# Patient Record
Sex: FEMALE | Race: AMERICAN INDIAN OR ALASKA NATIVE
[De-identification: names, ages, dates, MRNs, and addresses within clinical notes are randomized per-mention and may not be internally consistent; named-entity substitution may affect disease eponyms.]

---

## 2022-01-29 ENCOUNTER — HOSPITAL ENCOUNTER (INPATIENT)
Dept: HOSPITAL 5 - TRG | Age: 32
LOS: 1 days | Discharge: HOME | End: 2022-01-30
Attending: OBSTETRICS & GYNECOLOGY | Admitting: OBSTETRICS & GYNECOLOGY
Payer: COMMERCIAL

## 2022-01-29 DIAGNOSIS — Z3A.39: ICD-10-CM

## 2022-01-29 DIAGNOSIS — Z20.822: ICD-10-CM

## 2022-01-29 LAB
HCT VFR BLD CALC: 38.7 % (ref 30.3–42.9)
HGB BLD-MCNC: 13.1 GM/DL (ref 10.1–14.3)
MCHC RBC AUTO-ENTMCNC: 34 % (ref 30–34)
MCV RBC AUTO: 95 FL (ref 79–97)
PLATELET # BLD: 222 K/MM3 (ref 140–440)
RBC # BLD AUTO: 4.08 M/MM3 (ref 3.65–5.03)

## 2022-01-29 PROCEDURE — 0KQM0ZZ REPAIR PERINEUM MUSCLE, OPEN APPROACH: ICD-10-PCS | Performed by: OBSTETRICS & GYNECOLOGY

## 2022-01-29 PROCEDURE — 86850 RBC ANTIBODY SCREEN: CPT

## 2022-01-29 PROCEDURE — U0003 INFECTIOUS AGENT DETECTION BY NUCLEIC ACID (DNA OR RNA); SEVERE ACUTE RESPIRATORY SYNDROME CORONAVIRUS 2 (SARS-COV-2) (CORONAVIRUS DISEASE [COVID-19]), AMPLIFIED PROBE TECHNIQUE, MAKING USE OF HIGH THROUGHPUT TECHNOLOGIES AS DESCRIBED BY CMS-2020-01-R: HCPCS

## 2022-01-29 PROCEDURE — 85018 HEMOGLOBIN: CPT

## 2022-01-29 PROCEDURE — 85027 COMPLETE CBC AUTOMATED: CPT

## 2022-01-29 PROCEDURE — 86592 SYPHILIS TEST NON-TREP QUAL: CPT

## 2022-01-29 PROCEDURE — 85014 HEMATOCRIT: CPT

## 2022-01-29 PROCEDURE — 86901 BLOOD TYPING SEROLOGIC RH(D): CPT

## 2022-01-29 PROCEDURE — 10907ZC DRAINAGE OF AMNIOTIC FLUID, THERAPEUTIC FROM PRODUCTS OF CONCEPTION, VIA NATURAL OR ARTIFICIAL OPENING: ICD-10-PCS | Performed by: OBSTETRICS & GYNECOLOGY

## 2022-01-29 PROCEDURE — 36415 COLL VENOUS BLD VENIPUNCTURE: CPT

## 2022-01-29 PROCEDURE — 86900 BLOOD TYPING SEROLOGIC ABO: CPT

## 2022-01-29 PROCEDURE — 3E033VJ INTRODUCTION OF OTHER HORMONE INTO PERIPHERAL VEIN, PERCUTANEOUS APPROACH: ICD-10-PCS | Performed by: OBSTETRICS & GYNECOLOGY

## 2022-01-29 RX ADMIN — FENTANYL CITRATE PRN MCG: 50 INJECTION, SOLUTION INTRAMUSCULAR; INTRAVENOUS at 07:50

## 2022-01-29 RX ADMIN — SODIUM CHLORIDE, SODIUM LACTATE, POTASSIUM CHLORIDE, AND CALCIUM CHLORIDE SCH MLS/HR: .6; .31; .03; .02 INJECTION, SOLUTION INTRAVENOUS at 07:54

## 2022-01-29 RX ADMIN — FENTANYL CITRATE PRN MCG: 50 INJECTION, SOLUTION INTRAMUSCULAR; INTRAVENOUS at 13:07

## 2022-01-29 RX ADMIN — SODIUM CHLORIDE, SODIUM LACTATE, POTASSIUM CHLORIDE, AND CALCIUM CHLORIDE SCH MLS/HR: .6; .31; .03; .02 INJECTION, SOLUTION INTRAVENOUS at 12:47

## 2022-01-29 RX ADMIN — FENTANYL CITRATE PRN MCG: 50 INJECTION, SOLUTION INTRAMUSCULAR; INTRAVENOUS at 10:49

## 2022-01-29 RX ADMIN — IBUPROFEN SCH MG: 600 TABLET, FILM COATED ORAL at 18:08

## 2022-01-29 NOTE — HISTORY AND PHYSICAL REPORT
History of Present Illness


Date of examination: 22


Date of admission: 


22


Chief complaint: 


Contractions





History of present illness: 


31 year old female  presents to L&D complaining of contractions and 

possible leaking of fluid. 


Patient received prenatal care at [facility] and 

prenatal records are available.


LMP unknown. EDC 22 (based on 20 wk. US).


Pregnancy uncomplicated. Has history of 2 previous vaginal births. 


Prenatal labs are as follows: O+, antibody screen negative, rubella immune, 

varicella immune, hepatitis B surface antigen negative, hepatitis C antibody 

nonreactive, HIV negative, RPR nonreactive, hemoglobin electrophoresis normal, 1

hour sugar test 51, gonorrhea negative, chlamydia negative, trichomonas 

negative, GBS negative.                                                         

                                                                                

                                                                                

                                                                                

                          








Past History


Past Medical History: no pertinent history


Past Surgical History: no surgical history


GYN History: denies: chlamydia, gonorrhea, hepatitis B, hepatitis C, herpes, 

HIV, syphilis, trichomonas


Family/Genetic History: none


Social history: lives with family, full code.  denies: smoking, alcohol abuse, 

prescription drug abuse, IV drug use





- Obstetrical History


Expected Date of Delivery: 22


Actual Gestation: 39 Week(s) 5 Day(s) 


: 3


Para: 2


Hx # Term Pregnancies: 2


Number of  Pregnancies: 0


Spontaneous Abortions: 0


Induced : 0


Number of Living Children: 2





Medications and Allergies


                                    Allergies











Allergy/AdvReac Type Severity Reaction Status Date / Time


 


pineapple Allergy  Itching Verified 22 04:40











                                Home Medications











 Medication  Instructions  Recorded  Confirmed  Last Taken  Type


 


Prenatal Vitamin 1 tab PO QDAY 22 20:00 History














Review of Systems


All systems: negative (contractions, possible LOF)





- Vital Signs


Vital signs: 


                                   Vital Signs











Temp Pulse Resp BP


 


 98 F   89   17   103/63 


 


 22 04:30  22 04:30  22 04:30  22 04:30








                                        











Temp Pulse Resp BP Pulse Ox


 


 98 F   97 H  17   103/63   100 


 


 22 04:30  22 04:47  22 04:30  22 04:36  22 04:47














- Physical Exam


Abdomen: Positive: normal appearance, soft.  Negative: distention, tenderness, 

guarding, rigidity


Genitourinary (Female): Positive: normal external genitalia, normal perenium.  

Negative: perineal/vulvar lesions


Vagina: Positive: normal moisture


Uterus: Positive: enlarged.  Negative: tender


Anus/Rectum: Positive: normal perianal skin


Extremities: Negative: tenderness





- Obstetrical


FHR: category 2


Uterine Contraction Monitor Mode: External


Cervical Dilatation: 4


Cervical Effacement Percentage: 50


Fetal station: -3


Uterine Contraction Pattern: Regular


Uterine Contraction Intensity: Moderate





Results


Result Diagrams: 


                                 22 04:50





All other labs normal.








Assessment and Plan


A: Pregnancy at 39 weeks, 5 days gestation. 


Active labor. 


GBS negative.


P: Admit.


EFM.


Epidural if desired.

## 2022-01-29 NOTE — EVENT NOTE
Date: 22


Pt evaluated and FHR remains category I, pelvic / and pt to get pitocin 

ordered earlier by NATASHA Shepard to start at 8am per nurse report.  GBS negative 

per H and P.  PNC at [facility].  Expect

## 2022-01-29 NOTE — EVENT NOTE
Date: 01/29/22 (l)


I evaluated pt and no prenatal records in our nery system per my office 

manager Maureen.  With detail discussion, pt showed me her follow up ob card and 

she is a pt of [facility].  I will transfer care to Dr. Bernal and pt notified.  

FHR category and pt with ocassional ctx and epidural already received

## 2022-01-29 NOTE — PROCEDURE NOTE
OB Delivery Note





- Delivery


Date of Delivery: 22


Surgeon: CHERRI DOE





- Vaginal


Delivery presentation: vertex


Delivery position: OA


Intrapartum events: meconium


Delivery induction: oxytocin


Delivery augmentation: rupture of membranes


Delivery monitor: external FHT, external uterine


Route of delivery: 


Delivery placenta: spontaneous


Delivery cord: 3 umbilical vessels


Episiotomy: none


Delivery laceration: 2nd degree (perineum)


Delivery repair: chromic


Anesthesia: local (lidocaine 2%)


Delivery comments: 


SAVD of viable female infant uncomplicated.  Placenta spontaneous and complete 

with 3vessel cord.  Sustained 2nd degree perineal laceration and same repaired 

with 2-0 chromic running locked suture and lidocaine local anesthesic.  Bimanual

massage done and fundus firm.  EBL 350cc. Mom and baby stable.





- Infant


  ** A


Apgar at 1 minute: 8


Apgar at 5 minutes: 9


Infant Gender: Female (wt 3060g; meconium stained fluid)

## 2022-01-30 VITALS — SYSTOLIC BLOOD PRESSURE: 112 MMHG | DIASTOLIC BLOOD PRESSURE: 69 MMHG

## 2022-01-30 LAB
HCT VFR BLD CALC: 36.2 % (ref 30.3–42.9)
HGB BLD-MCNC: 12.3 GM/DL (ref 10.1–14.3)

## 2022-01-30 RX ADMIN — IBUPROFEN SCH MG: 600 TABLET, FILM COATED ORAL at 09:51

## 2022-01-30 RX ADMIN — IBUPROFEN SCH MG: 600 TABLET, FILM COATED ORAL at 00:30

## 2022-01-30 NOTE — EVENT NOTE
Date: 01/30/22


Patient is doing well and desires discharge home this evening. Baby has just 

been discharged. Discussed with patient postpartum discharge instructions and 

warning signs. Advised patient to continue taking her prenatal vitamin daily at 

home. Advised patient to avoid intercourse, lifting, and housework. Advised 

patient to follow up at [facility] in 2 weeks. 

Patient voiced understanding of all instructions.

## 2022-01-30 NOTE — PROGRESS NOTE
Assessment and Plan


A: Postpartum day 1 S/P .


P: Continue routine postpartum care.


Anticipate discharge home tomorrow if patient continues to do well. 








Subjective





- Subjective


Date of service: 22


Principal diagnosis: Postpartum day 1 S/P 


Patient reports: appetite normal, voiding normally, pain well controlled, 

flatus, ambulating normally, no dizzy ambulation, no nauseated


: doing well





Objective





- Vital Signs


Latest vital signs: 


                                   Vital Signs











  Temp Pulse Resp BP BP Pulse Ox Pulse Ox


 


 22 08:51  98.1 F  66  18  104/62   100 


 


 22 08:45        99


 


 22 01:30    18    


 


 22 01:00  98.6 F  73  20  94/61   99 


 


 22 00:30    18    


 


 22 20:44  97.9 F  71  20  99/52   100 


 


 22 20:24        99


 


 22 19:08    18    


 


 22 18:11        99


 


 22 18:08    16    


 


 22 17:00  98.7 F  80  18   104/61  99 


 


 22 16:31   69   108/63   99 


 


 22 16:27   96 H     68 L 


 


 22 16:26   83     97 


 


 22 16:21   78     99 


 


 22 16:16   88     98 


 


 22 16:15   82   117/60   


 


 22 16:11   88     97 


 


 22 16:06   80     98 


 


 22 16:01   77     98 


 


 22 15:57   181 H   117/66   


 


 22 15:56   76     98 


 


 22 15:51   74     97 


 


 22 15:46   93 H     98 


 


 22 15:42   76   128/68   


 


 22 15:41   73     98 


 


 22 15:36   76     98 


 


 22 15:31   82     99 


 


 22 15:28   75   130/67   


 


 22 15:26   79     98 


 


 22 15:21   78     98 


 


 22 15:16   85     98 


 


 22 15:11   93 H     100 


 


 22 15:06   83     99 


 


 22 15:04   97 H     63 L 


 


 22 15:01   75     100 


 


 22 14:58   81     90 


 


 22 14:56       98 


 


 22 14:55   86   141/61   


 


 22 14:51   78     99 


 


 22 14:46   87     98 


 


 22 14:43   71     42 L 


 


 22 14:41   68     98 


 


 22 14:36   87     97 


 


 22 14:34   86     93 


 


 22 14:31   71     99 


 


 22 14:26   70     100 


 


 22 14:24   73     93 


 


 22 14:21   85     97 


 


 22 14:17   70     93 


 


 22 14:16   65     97 


 


 22 14:11   86     100 


 


 22 14:06   68     100 


 


 22 14:05   72     93 


 


 22 14:01   66     96 


 


 22 13:56   62     99 


 


 22 13:55   67   125/66   92 


 


 22 13:51   73     100 


 


 22 13:49   68     93 


 


 22 13:46   86     99 


 


 22 13:41   77     100 


 


 22 13:36   62     100 


 


 22 13:31   71     96 


 


 22 13:29   87     93 


 


 22 13:26   73     97 


 


 22 13:21   69     93 


 


 22 13:16   78     99 


 


 22 13:11   69     96 


 


 22 13:08   79     89 


 


 22 13:06   62     100 


 


 22 12:47   72     93 


 


 22 12:43   67     100 


 


 22 12:38   81     100 


 


 22 12:37   77     93 


 


 22 12:33   64     100 


 


 22 12:28   62     100 


 


 22 12:23   66     100 


 


 22 12:18   66     99 


 


 22 12:13   79     98 


 


 22 12:08   82     100 


 


 22 12:07   73     93 


 


 22 12:03   61     92 


 


 22 12:00  98.5 F      


 


 22 11:59   83     92 


 


 22 11:58   72     95 


 


 22 11:54   65   127/75   


 


 22 11:53   74     100 


 


 22 11:48   86     99 


 


 22 11:43   67     100 


 


 22 11:38   80     100 


 


 22 11:33   77     100 


 


 22 11:28   71     96 


 


 22 11:23   83     98 


 


 22 11:18   69     98 


 


 22 11:13   64     100 


 


 22 11:08   76     98 


 


 22 11:03   72     100 


 


 22 10:58   85     99 


 


 22 10:54   76   99/54   


 


 22 10:53   72     99 


 


 22 10:48   75     99 


 


 22 10:23   80     100 


 


 22 10:18   82     100 


 


 22 10:13   78     100 


 


 22 10:08   68     100 


 


 22 10:03   74     100 


 


 22 09:58   84     100 


 


 22 09:55   75   104/58   








                                Intake and Output











 22





 23:59 07:59 15:59


 


Intake Total 720 240 


 


Output Total 1550  


 


Balance -830 240 


 


Intake:   


 


  Oral 480  


 


  Intake, Free Water 240 240 


 


Output:   


 


  Urine 1550  


 


    Void 1550  


 


Other:   


 


  Total, Intake Amount 480  


 


  Total, Output Amount 700  














- Exam


Cardiovascular: Present: Regular rate, No murmurs


Lungs: Present: Clear to auscultation


Abdomen: Present: normal appearance, soft, normal bowel sounds.  Absent: 

distention, tenderness, guarding, rigidity


Uterus: Present: normal, firm, fundal height below umbilicus (fundus firm and 

midline at 1 FB below umbilicus).  Absent: bogginess, tenderness


Extremities: Absent: tenderness, edema

## 2022-01-30 NOTE — DISCHARGE SUMMARY
Providers





- Providers


Date of Admission: 


22 04:46





Date of discharge: 22


Attending physician: 


CHERRI DOE





Primary care physician: 


CHERRI DOE








Hospitalization


Reason for admission: active labor


Delivery: 


Laceration: 2nd degree


Other postpartum procedures: none


Postpartum complications: none


Discharge diagnosis: IUP at term delivered


[place] baby: female


Pertinent studies: 


Labs





Hospital course: 


Stable hospital course





Condition at discharge: Good


Disposition: 01 HOME / SELF CARE / HOMELESS





- Discharge Diagnoses


(1) Term pregnancy delivered


Status: Acute   





Plan





- Discharge Medications


Prescriptions: 


Prenatal Vit-Fe Fumar-FA [Prenatal Vitamin] 1 tab PO QDAY 30 Days #30 tablet





- Provider Discharge Summary


Activity: routine, no sex for 6 weeks, no heavy lifting 4 weeks, no strenuous 

exercise


Diet: routine


Instructions: routine


Additional instructions: 


Continue taking your prenatal vitamin daily at home. 


Follow up at [facility] clinic in 2 weeks.





Call your doctor immediately for:


* Fever > 100.5


* Heavy vaginal bleeding ( >1 pad per hour)


* Severe persistent headache


* Shortness of breath


* Reddened, hot, painful area to leg or breast











- Follow up plan


Follow up: 


PRIMARY CARE,MD [Referring] - 14 Days


Forms:  [facility] Discharge Summary